# Patient Record
Sex: FEMALE | Race: WHITE | Employment: OTHER | URBAN - METROPOLITAN AREA
[De-identification: names, ages, dates, MRNs, and addresses within clinical notes are randomized per-mention and may not be internally consistent; named-entity substitution may affect disease eponyms.]

---

## 2019-12-21 ENCOUNTER — HOSPITAL ENCOUNTER (EMERGENCY)
Facility: CLINIC | Age: 24
Discharge: HOME | End: 2019-12-22
Attending: EMERGENCY MEDICINE | Admitting: EMERGENCY MEDICINE

## 2019-12-21 ENCOUNTER — TRANSFERRED RECORDS (OUTPATIENT)
Dept: HEALTH INFORMATION MANAGEMENT | Facility: CLINIC | Age: 24
End: 2019-12-21

## 2019-12-21 DIAGNOSIS — A09 INFECTIOUS DIARRHEA IN ADULT PATIENT: ICD-10-CM

## 2019-12-21 LAB
ALBUMIN SERPL-MCNC: 3.1 G/DL (ref 3.4–5)
ALP SERPL-CCNC: 52 U/L (ref 40–150)
ALT SERPL W P-5'-P-CCNC: 13 U/L (ref 0–50)
ANION GAP SERPL CALCULATED.3IONS-SCNC: 8 MMOL/L (ref 3–14)
AST SERPL W P-5'-P-CCNC: 20 U/L (ref 0–45)
BASOPHILS # BLD AUTO: 0.1 10E9/L (ref 0–0.2)
BASOPHILS NFR BLD AUTO: 0.6 %
BILIRUB DIRECT SERPL-MCNC: <0.1 MG/DL (ref 0–0.2)
BILIRUB SERPL-MCNC: 0.3 MG/DL (ref 0.2–1.3)
BUN SERPL-MCNC: 4 MG/DL (ref 7–30)
CALCIUM SERPL-MCNC: 8.8 MG/DL (ref 8.5–10.1)
CHLORIDE SERPL-SCNC: 103 MMOL/L (ref 94–109)
CO2 SERPL-SCNC: 25 MMOL/L (ref 20–32)
CREAT SERPL-MCNC: 0.7 MG/DL (ref 0.52–1.04)
DIFFERENTIAL METHOD BLD: ABNORMAL
EOSINOPHIL # BLD AUTO: 0 10E9/L (ref 0–0.7)
EOSINOPHIL NFR BLD AUTO: 0.4 %
ERYTHROCYTE [DISTWIDTH] IN BLOOD BY AUTOMATED COUNT: 12.6 % (ref 10–15)
GFR SERPL CREATININE-BSD FRML MDRD: >90 ML/MIN/{1.73_M2}
GLUCOSE SERPL-MCNC: 118 MG/DL (ref 70–99)
HCT VFR BLD AUTO: 39.9 % (ref 35–47)
HGB BLD-MCNC: 13.6 G/DL (ref 11.7–15.7)
IMM GRANULOCYTES # BLD: 0.2 10E9/L (ref 0–0.4)
IMM GRANULOCYTES NFR BLD: 1.8 %
LYMPHOCYTES # BLD AUTO: 1.7 10E9/L (ref 0.8–5.3)
LYMPHOCYTES NFR BLD AUTO: 20 %
MCH RBC QN AUTO: 29.6 PG (ref 26.5–33)
MCHC RBC AUTO-ENTMCNC: 34.1 G/DL (ref 31.5–36.5)
MCV RBC AUTO: 87 FL (ref 78–100)
MONOCYTES # BLD AUTO: 2.4 10E9/L (ref 0–1.3)
MONOCYTES NFR BLD AUTO: 28.5 %
NEUTROPHILS # BLD AUTO: 4 10E9/L (ref 1.6–8.3)
NEUTROPHILS NFR BLD AUTO: 48.7 %
NRBC # BLD AUTO: 0 10*3/UL
NRBC BLD AUTO-RTO: 0 /100
PLATELET # BLD AUTO: 318 10E9/L (ref 150–450)
POTASSIUM SERPL-SCNC: 2.7 MMOL/L (ref 3.4–5.3)
PROT SERPL-MCNC: 7.1 G/DL (ref 6.8–8.8)
RBC # BLD AUTO: 4.59 10E12/L (ref 3.8–5.2)
RBC MORPH BLD: ABNORMAL
SODIUM SERPL-SCNC: 136 MMOL/L (ref 133–144)
WBC # BLD AUTO: 8.3 10E9/L (ref 4–11)

## 2019-12-21 PROCEDURE — 25800030 ZZH RX IP 258 OP 636: Performed by: EMERGENCY MEDICINE

## 2019-12-21 PROCEDURE — 96361 HYDRATE IV INFUSION ADD-ON: CPT

## 2019-12-21 PROCEDURE — 25000128 H RX IP 250 OP 636: Performed by: EMERGENCY MEDICINE

## 2019-12-21 PROCEDURE — 85025 COMPLETE CBC W/AUTO DIFF WBC: CPT | Performed by: EMERGENCY MEDICINE

## 2019-12-21 PROCEDURE — 93005 ELECTROCARDIOGRAM TRACING: CPT

## 2019-12-21 PROCEDURE — 84132 ASSAY OF SERUM POTASSIUM: CPT | Performed by: EMERGENCY MEDICINE

## 2019-12-21 PROCEDURE — 87506 IADNA-DNA/RNA PROBE TQ 6-11: CPT | Performed by: EMERGENCY MEDICINE

## 2019-12-21 PROCEDURE — 87209 SMEAR COMPLEX STAIN: CPT | Performed by: EMERGENCY MEDICINE

## 2019-12-21 PROCEDURE — 25000125 ZZHC RX 250: Performed by: EMERGENCY MEDICINE

## 2019-12-21 PROCEDURE — 87493 C DIFF AMPLIFIED PROBE: CPT | Performed by: EMERGENCY MEDICINE

## 2019-12-21 PROCEDURE — 96375 TX/PRO/DX INJ NEW DRUG ADDON: CPT

## 2019-12-21 PROCEDURE — 96365 THER/PROPH/DIAG IV INF INIT: CPT | Mod: 59

## 2019-12-21 PROCEDURE — 25000132 ZZH RX MED GY IP 250 OP 250 PS 637: Performed by: EMERGENCY MEDICINE

## 2019-12-21 PROCEDURE — 80076 HEPATIC FUNCTION PANEL: CPT | Performed by: EMERGENCY MEDICINE

## 2019-12-21 PROCEDURE — 99285 EMERGENCY DEPT VISIT HI MDM: CPT | Mod: 25

## 2019-12-21 PROCEDURE — 87177 OVA AND PARASITES SMEARS: CPT | Performed by: EMERGENCY MEDICINE

## 2019-12-21 PROCEDURE — 80048 BASIC METABOLIC PNL TOTAL CA: CPT | Performed by: EMERGENCY MEDICINE

## 2019-12-21 RX ORDER — KETOROLAC TROMETHAMINE 15 MG/ML
15 INJECTION, SOLUTION INTRAMUSCULAR; INTRAVENOUS ONCE
Status: COMPLETED | OUTPATIENT
Start: 2019-12-21 | End: 2019-12-21

## 2019-12-21 RX ORDER — POTASSIUM CHLORIDE 1500 MG/1
40 TABLET, EXTENDED RELEASE ORAL ONCE
Status: COMPLETED | OUTPATIENT
Start: 2019-12-21 | End: 2019-12-21

## 2019-12-21 RX ORDER — HYDROMORPHONE HYDROCHLORIDE 1 MG/ML
0.5 INJECTION, SOLUTION INTRAMUSCULAR; INTRAVENOUS; SUBCUTANEOUS ONCE
Status: COMPLETED | OUTPATIENT
Start: 2019-12-21 | End: 2019-12-21

## 2019-12-21 RX ORDER — ONDANSETRON 2 MG/ML
4 INJECTION INTRAMUSCULAR; INTRAVENOUS ONCE
Status: COMPLETED | OUTPATIENT
Start: 2019-12-21 | End: 2019-12-21

## 2019-12-21 RX ORDER — POTASSIUM CL/LIDO/0.9 % NACL 10MEQ/0.1L
10 INTRAVENOUS SOLUTION, PIGGYBACK (ML) INTRAVENOUS ONCE
Status: COMPLETED | OUTPATIENT
Start: 2019-12-21 | End: 2019-12-21

## 2019-12-21 RX ORDER — LEVOFLOXACIN 500 MG/1
500 TABLET, FILM COATED ORAL ONCE
Status: COMPLETED | OUTPATIENT
Start: 2019-12-21 | End: 2019-12-21

## 2019-12-21 RX ADMIN — ONDANSETRON HYDROCHLORIDE 4 MG: 2 INJECTION, SOLUTION INTRAMUSCULAR; INTRAVENOUS at 21:43

## 2019-12-21 RX ADMIN — LEVOFLOXACIN 500 MG: 500 TABLET, FILM COATED ORAL at 23:44

## 2019-12-21 RX ADMIN — POTASSIUM CHLORIDE 40 MEQ: 1500 TABLET, EXTENDED RELEASE ORAL at 22:41

## 2019-12-21 RX ADMIN — Medication 10 MEQ: at 22:36

## 2019-12-21 RX ADMIN — HYDROMORPHONE HYDROCHLORIDE 0.5 MG: 1 INJECTION, SOLUTION INTRAMUSCULAR; INTRAVENOUS; SUBCUTANEOUS at 21:43

## 2019-12-21 RX ADMIN — Medication 2 G: at 23:00

## 2019-12-21 RX ADMIN — SODIUM CHLORIDE, POTASSIUM CHLORIDE, SODIUM LACTATE AND CALCIUM CHLORIDE 1000 ML: 600; 310; 30; 20 INJECTION, SOLUTION INTRAVENOUS at 21:44

## 2019-12-21 RX ADMIN — SODIUM CHLORIDE, POTASSIUM CHLORIDE, SODIUM LACTATE AND CALCIUM CHLORIDE 1000 ML: 600; 310; 30; 20 INJECTION, SOLUTION INTRAVENOUS at 23:43

## 2019-12-21 RX ADMIN — KETOROLAC TROMETHAMINE 15 MG: 15 INJECTION, SOLUTION INTRAMUSCULAR; INTRAVENOUS at 21:43

## 2019-12-21 ASSESSMENT — MIFFLIN-ST. JEOR: SCORE: 1349.16

## 2019-12-21 ASSESSMENT — ENCOUNTER SYMPTOMS
BLOOD IN STOOL: 1
APPETITE CHANGE: 1
VOMITING: 1
DIARRHEA: 1
ABDOMINAL PAIN: 1
FEVER: 0
NAUSEA: 1

## 2019-12-21 NOTE — ED AVS SNAPSHOT
[facility] Emergency Department  [address]  [facility] [address]  Phone:  [phone]  Fax:  [phone]                                    Sharee Rosales   MRN: [id number]    Department:  [facility] Emergency Department   Date of Visit:  12/21/2019           After Visit Summary Signature Page    I have received my discharge instructions, and my questions have been answered. I have discussed any challenges I see with this plan with the nurse or doctor.    ..........................................................................................................................................  Patient/Patient Representative Signature      ..........................................................................................................................................  Patient Representative Print Name and Relationship to Patient    ..................................................               ................................................  Date                                   Time    ..........................................................................................................................................  Reviewed by Signature/Title    ...................................................              ..............................................  Date                                               Time          22EPIC Rev 08/18

## 2019-12-22 ENCOUNTER — TELEPHONE (OUTPATIENT)
Dept: EMERGENCY MEDICINE | Facility: CLINIC | Age: 24
End: 2019-12-22

## 2019-12-22 VITALS
OXYGEN SATURATION: 98 % | WEIGHT: 133 LBS | TEMPERATURE: 98.8 F | SYSTOLIC BLOOD PRESSURE: 110 MMHG | DIASTOLIC BLOOD PRESSURE: 77 MMHG | RESPIRATION RATE: 18 BRPM | HEART RATE: 78 BPM | BODY MASS INDEX: 22.16 KG/M2 | HEIGHT: 65 IN

## 2019-12-22 LAB
C COLI+JEJUNI+LARI FUSA STL QL NAA+PROBE: NOT DETECTED
C DIFF TOX B STL QL: NEGATIVE
EC STX1 GENE STL QL NAA+PROBE: ABNORMAL
EC STX2 GENE STL QL NAA+PROBE: NOT DETECTED
ENTERIC PATHOGEN COMMENT: ABNORMAL
NOROV GI+II ORF1-ORF2 JNC STL QL NAA+PR: NOT DETECTED
POTASSIUM SERPL-SCNC: 2.9 MMOL/L (ref 3.4–5.3)
RVA NSP5 STL QL NAA+PROBE: NOT DETECTED
SALMONELLA SP RPOD STL QL NAA+PROBE: NOT DETECTED
SHIGELLA SP+EIEC IPAH STL QL NAA+PROBE: ABNORMAL
SPECIMEN SOURCE: NORMAL
V CHOL+PARA RFBL+TRKH+TNAA STL QL NAA+PR: NOT DETECTED
Y ENTERO RECN STL QL NAA+PROBE: NOT DETECTED

## 2019-12-22 RX ORDER — POTASSIUM CHLORIDE 1500 MG/1
20 TABLET, EXTENDED RELEASE ORAL 2 TIMES DAILY WITH MEALS
Qty: 6 TABLET | Refills: 0 | Status: SHIPPED | OUTPATIENT
Start: 2019-12-22 | End: 2019-12-25

## 2019-12-22 RX ORDER — LEVOFLOXACIN 500 MG/1
500 TABLET, FILM COATED ORAL DAILY
Qty: 2 TABLET | Refills: 0 | Status: SHIPPED | OUTPATIENT
Start: 2019-12-23 | End: 2019-12-25

## 2019-12-22 RX ORDER — OXYCODONE HYDROCHLORIDE 5 MG/1
5 TABLET ORAL EVERY 6 HOURS PRN
Qty: 6 TABLET | Refills: 0 | Status: SHIPPED | OUTPATIENT
Start: 2019-12-22

## 2019-12-22 RX ORDER — ONDANSETRON 4 MG/1
4 TABLET, ORALLY DISINTEGRATING ORAL EVERY 8 HOURS PRN
Qty: 10 TABLET | Refills: 0 | Status: SHIPPED | OUTPATIENT
Start: 2019-12-22 | End: 2019-12-25

## 2019-12-22 NOTE — ED TRIAGE NOTES
Pt presents for evaluation of bloody diarrhea x 4 days, abdominal spasms, nausea, emesis, fever and decreased appetite. Pt just got back from [place]. Seen in the ED there, given antibiotics for gastroenteritis.

## 2019-12-22 NOTE — DISCHARGE INSTRUCTIONS
Use tylenol and/or ibuprofen for pain or discomfort    Use Oxycodone for severe pain uncontrolled by above medications    Opioid Medication Information  You have been given a prescription for an opioid (narcotic) pain medicine and/or have received a pain medicine while here in the emergency department. These medicines can make you drowsy or impaired. You must not drive, operate dangerous equipment, or engage in any other dangerous activities while taking these medications. If you drive while taking these medications, you could be arrested for DUI, or driving under the influence. Do not drink any alcohol while you are taking these medications.   Opioid pain medications can cause addiction. If you have a history of chemical dependency of any type, you are at a higher risk of becoming addicted to pain medications. Only take these prescribed medications to treat your pain when all other options have been tried. Take it for as short a time and as few doses as possible. Store your pain pills in a secure place, as they are frequently stolen and provide a dangerous opportunity for children or visitors in your house to start abusing these powerful medications. We will not replace any lost or stolen medicine. As soon as your pain is better, you should flush all your remaining medication.   Many prescription pain medications contain Tylenol (acetaminophen), including Vicodin, Tylenol #3, Norco, Lortab, and Percocet. You should not take any extra pills of Tylenol if you are using these prescription medications or you can get very sick. Do not ever take more than 4000 mg of acetaminophen in any 24 hour period.  All opioids tend to cause constipation. Drink plenty of water and eat foods that have a lot of fiber, such as fruits, vegetables, prune juice, apple juice and high fiber cereal. Take a laxative if you don t move your bowels at least every other day. Miralax, Milk of Magnesia, Colace, or Senna can be used to keep you  regular.

## 2019-12-22 NOTE — TELEPHONE ENCOUNTER
[facility] Emergency Department Lab result notification [Adult-Female]    [facility] ED lab result protocol used  Enteric Bacteria and Virus Panel     Reason for call  Notify of lab results, assess symptoms,  review ED providers recommendations/discharge instructions (if necessary) and advise per ED lab result f/u protocol    Lab Result (including Rx patient on, if applicable)  Final Enteric Bacteria and Virus Panel by LAUREN Stool is POSITIVE for Shigella AND Shiga toxin 1  Patient to be notified, symptom's assessed and advised per [facility] ED lab result protocol.    Information table from ED Provider visit on 12/21/19-12/22/19  Symptoms reported at ED visit (Chief complaint, HPI) Sharee Rosales is a 24 year old female who presents to the emergency department today for evaluation of multiple GI symptoms. She and her  just got back from the [place] where they both began to be ill 4 days ago, 4 days into their trip. The patient reports nausea, vomiting, bloody diarrhea, abdominal pain, fever, and decreased appetite. At its worst she was having bloody diarrhea every 15 minutes which she describes as maroon in color. She was seen in the ED while on the trip and given antibiotics for gastroenteritis. Since then her symptoms have continued. The patient has no history of abdominal surgeries.    Significant Medical hx, if applicable (i.e. CKD, diabetes) None   Allergies No Known Allergies   Weight, if applicable Wt Readings from Last 2 Encounters:   12/21/19 60.3 kg (133 lb)      Coumadin/Warfarin [Yes /No] No   Creatinine Level (mg/dl) Creatinine   Date Value Ref Range Status   12/21/2019 0.70 0.52 - 1.04 mg/dL Final      Creatinine clearance (ml/min), if applicable Serum creatinine: 0.7 mg/dL 12/21/19 2134  Estimated creatinine clearance: 117 mL/min   Pregnant (Yes/No/NA) No   Breastfeeding (Yes/No/NA) No   ED providers Impression and Plan (applicable information) Sharee Rosales is a 24 year  old female who presents to the emergency department today for evaluation of abdominal pain, vomiting, bloody diarrhea after returning from the [place].  Her  is here with similar symptoms.  She was seen in a clinic down [facility] had an unremarkable BMP and CBC, negative occult blood and was given Flagyl and sent home with antiemetics, Flagyl and acetaminophen.  Continues to have frequent stools and abdominal cramping as well as what sounds like hematochezia.  Here vitals are stable no significant localizing abdominal tenderness on examination and presentation the most certainly consistent with invasive infectious traveler's diarrhea.  Will send stool studies here.  Did well with crystalloid fluid.  R BMP shows hypo-kalemia which was replaced both IV and orally as well as with magnesium.  Repeat level shows that she is still low but improving and she was well enough here to tolerate p.o. intake.  I think at this point she can be discharged home safely and continue oral potassium replacement, we did give her levofloxacin for traveler's diarrhea and supportive cares with analgesics antiemetics and a few more days of potassium oral supplementation.  She is aware the stool studies will result over the next day or 2 she will be contacted if something is positive and has any new or worsening symptoms she will return here to the emergency department.   ED diagnosis  Infectious diarrhea in adult patient      ED provider Terry Mcadams MD      RN Assessment (Patient s current Symptoms), include time called.  [Insert Left message here if message left]  Currently and overnight, going hourly with blood.  Only improvement is less abdominal pain.  No reported nausea, drinking fluids without problems.  Reviewed general information including infection control related to both Shiga and Shigella toxin.  To avoid antidiarrheals, to start probiotics.  Reviewed s/s of HUS and dehydration both with  which she should report to ED with.   Questions answered.        [facility] Provider Name & Recommendations (included time consulted)  Reviewed with DEONTE PALM CNP in [facility].  To continue with abx as prescribed.       Please Contact your PCP clinic or return to the Emergency department if your:    Symptoms return.    Symptoms do not improve after 3 days on antibiotic.    Symptoms do not resolve after completing antibiotic.    Symptoms worsen or other concerning symptom's.    PCP follow-up Questions asked: YES       Franca Hammond RN    [facility] [place]   Lung Nodule and ED Lab Results F/U RN  Epic pool (ED late result f/u RN) : P [id number]   # [phone]    Copy of Lab result   Patient to be notified, symptom's assessed and advised per [facility] ED lab result protocol.   Contains abnormal data Enteric Bacteria and Virus Panel by LAUREN Stool   Order: [id number]   Collected:  12/21/2019  9:42 PM Status:  Edited Result - FINAL   Specimen Information: Feces          Ref Range & Units 1d ago    Campylobacter group by LAUREN NDET^Not Detected Not Detected     Salmonella species by LAUREN NDET^Not Detected Not Detected     Shigella species by LAUREN NDET^Not Detected Detected, Abnormal ResultAbnormal     Comment: Critical Value/Significant Value called to and read back by   Franca Hammond R.N. notified on 10.22.2019 at 1000am JK JT     Vibrio group by LAUREN NDET^Not Detected Not Detected     Rotavirus A by LAUREN NDET^Not Detected Not Detected     Shiga toxin 1 gene by LAUREN NDET^Not Detected Detected, Abnormal ResultAbnormal     Comment: Critical Value/Significant Value called to and read back by   Franca Hammond R.N. notified on 10.22.2019  at 1000am Jk JT     Shiga toxin 2 gene by LAUREN NDET^Not Detected Not Detected     Norovirus I and II by LAUREN NDET^Not Detected Not Detected     Yersinia enterocolitica by LAUREN NDET^Not Detected Not Detected

## 2019-12-22 NOTE — ED PROVIDER NOTES
"History     Chief Complaint:  Nausea, Vomiting, & Diarrhea    HPI  Sharee Rosales is a 24 year old female who presents to the emergency department today for evaluation of multiple GI symptoms. She and her  just got back from the [facility] [place] where they both began to be ill 4 days ago, 4 days into their trip. The patient reports nausea, vomiting, bloody diarrhea, abdominal pain, fever, and decreased appetite. At its worst she was having bloody diarrhea every 15 minutes which she describes as maroon in color. She was seen in the ED while on the trip and given antibiotics for gastroenteritis. Since then her symptoms have continued. The patient has no history of abdominal surgeries.       Allergies:  No known drug allergies    Medications:    The patient is not currently taking any prescribed medications.    Past Medical History:    The patient does not have any past pertinent medical history.    Past Surgical History:    History reviewed. No pertinent surgical history.    Family History:    History reviewed. No pertinent family history.     Social History:  The patient arrives with her   Just arrived back from vacation  Marital Status:  [2]      Review of Systems   Constitutional: Positive for appetite change. Negative for fever.   Gastrointestinal: Positive for abdominal pain, blood in stool, diarrhea, nausea and vomiting.   All other systems reviewed and are negative.      Physical Exam     Patient Vitals for the past 24 hrs:   BP Temp Temp src Pulse Heart Rate Resp SpO2 Height Weight   12/21/19 2330 -- -- -- -- -- -- 98 % -- --   12/21/19 2300 124/74 -- -- 74 -- -- 99 % -- --   12/21/19 2230 129/77 -- -- 73 -- -- 99 % -- --   12/21/19 2200 (!) 137/97 -- -- 74 -- -- 98 % -- --   12/21/19 2130 124/77 -- -- -- -- -- -- -- --   12/21/19 2113 106/73 98.8  F (37.1  C) Temporal -- 91 18 99 % 1.651 m (5' 5\") 60.3 kg (133 lb)     Physical Exam  Gen: well appearing, in no acute distress  HEENT:  " mmm, no rhinorrhea  Neck: supple, no abnormal swelling  Lungs:  CTAB,  no resp distress  CV: rrr, no m/r/g, ppi  Abd: soft, mild generalized tenderness palpation, nondistended, no rebound/masses/guarding/hsm  Ext: no peripheral edema  Skin: warm, dry, well perfused, no rashes/bruising/lesions on exposed skin  Neuro: alert, MAEE, no gross motor or sensory deficits, gait stable  Psych: Normal mood, normal affect      Emergency Department Course     ECG:  ECG taken at 2252, ECG read at 2257  Normal sinus rhythm with sinus arrhythmia  Normal ECG  Rate 64 bpm. OK interval 128 ms. QRS duration 92 ms. QT/QTc 408/420 ms. P-R-T axes 38 42 41.    Laboratory:  Laboratory findings were communicated with the patient who voiced understanding of the findings.    CBC:  o/w WNL. (WBC 8.3, HGB 13.6, )   BMP: Glucose 118 (H), BUN 4 (L), o/w WNL (Creatinine: 0.70)    Hepatic panel: albumin 3.1 (L), ow WNL    Clostridium difficile toxin B in progress   Enteric bacteria and virus panel in progress   Ova and parasite exam routine in progress     Procedures      Interventions:  2143 Zofran 4mg IV injection   2143 Dilaudid 0.5mg IV injection  2143 Toradol, 15 mg, IV  2144 lactated ringers 1L IV  2236 potassium chloride 10 mEq IV  2241 potassium chloride 40 mEq PO  2300 magnesium 2 g IV  2344 Levauin 500 mg PO    Emergency Department Course:  Past medical records, nursing notes, and vitals reviewed.  2121: I performed an exam of the patient and obtained history, as documented above.     IV was inserted and blood was drawn for laboratory testing, results above.    Recheck the patient who was feeling much improved after interventions here in the emergency department.  Impression & Plan        Medical Decision Making:  Sharee Rosales is a 24 year old female who presents to the emergency department today for evaluation of abdominal pain, vomiting, bloody diarrhea after returning from the [place].  Her  is here with  similar symptoms.  She was seen in a clinic [facility] had an unremarkable BMP and CBC, negative occult blood and was given Flagyl and sent home with antiemetics, Flagyl and acetaminophen.  Continues to have frequent stools and abdominal cramping as well as what sounds like hematochezia.  Here vitals are stable no significant localizing abdominal tenderness on examination and presentation the most certainly consistent with invasive infectious traveler's diarrhea.  Will send stool studies here.  Did well with crystalloid fluid.  R BMP shows hypo-kalemia which was replaced both IV and orally as well as with magnesium.  Repeat level shows that she is still low but improving and she was well enough here to tolerate p.o. intake.  I think at this point she can be discharged home safely and continue oral potassium replacement, we did give her levofloxacin for traveler's diarrhea and supportive cares with analgesics antiemetics and a few more days of potassium oral supplementation.  She is aware the stool studies will result over the next day or 2 she will be contacted if something is positive and has any new or worsening symptoms she will return here to the emergency department.    Diagnosis:    ICD-10-CM    1. Infectious diarrhea in adult patient A09 Clostridium difficile toxin B PCR     Potassium       Disposition:   discharged to home    Discharge Medications:     Medication List      Started    levofloxacin 500 MG tablet  Commonly known as:  LEVAQUIN  500 mg, Oral, DAILY  Start taking on:  December 23, 2019     ondansetron 4 MG ODT tab  Commonly known as:  Zofran ODT  4 mg, Oral, EVERY 8 HOURS PRN     oxyCODONE 5 MG tablet  Commonly known as:  ROXICODONE  5 mg, Oral, EVERY 6 HOURS PRN     potassium chloride ER 20 MEQ CR tablet  Commonly known as:  K-TAB  20 mEq, Oral, 2 TIMES DAILY WITH MEALS            Scribe Disclosure:  Tiara MELÉNDEZ, am serving as a scribe at 9:21 PM on 12/21/2019 to document services  personally performed by Terry Mcadams MD* based on my observations and the provider's statements to me.    [facility] EMERGENCY DEPARTMENT       Terry Mcadams MD  12/22/19 0103

## 2019-12-23 LAB
INTERPRETATION ECG - MUSE: NORMAL
O+P STL MICRO: NORMAL
SPECIMEN SOURCE: NORMAL

## 2019-12-23 NOTE — RESULT ENCOUNTER NOTE
Final Ova and Parasite Exam Routine is NEGATIVE.  No treatment or change in treatment per [facility] ED Lab Result protocol.